# Patient Record
Sex: MALE | Race: WHITE | NOT HISPANIC OR LATINO | Employment: FULL TIME | URBAN - METROPOLITAN AREA
[De-identification: names, ages, dates, MRNs, and addresses within clinical notes are randomized per-mention and may not be internally consistent; named-entity substitution may affect disease eponyms.]

---

## 2025-02-26 ENCOUNTER — OFFICE VISIT (OUTPATIENT)
Dept: URGENT CARE | Facility: CLINIC | Age: 25
End: 2025-02-26

## 2025-02-26 VITALS
OXYGEN SATURATION: 95 % | DIASTOLIC BLOOD PRESSURE: 83 MMHG | RESPIRATION RATE: 16 BRPM | SYSTOLIC BLOOD PRESSURE: 123 MMHG | TEMPERATURE: 98.7 F | HEART RATE: 109 BPM

## 2025-02-26 DIAGNOSIS — J11.1 INFLUENZA-LIKE ILLNESS: Primary | ICD-10-CM

## 2025-02-26 DIAGNOSIS — R52 BODY ACHES: ICD-10-CM

## 2025-02-26 DIAGNOSIS — R05.9 COUGH, UNSPECIFIED TYPE: ICD-10-CM

## 2025-02-26 PROCEDURE — 99203 OFFICE O/P NEW LOW 30 MIN: CPT | Performed by: PHYSICIAN ASSISTANT

## 2025-02-26 RX ORDER — OSELTAMIVIR PHOSPHATE 75 MG/1
75 CAPSULE ORAL 2 TIMES DAILY
Qty: 10 CAPSULE | Refills: 0 | Status: SHIPPED | OUTPATIENT
Start: 2025-02-26 | End: 2025-03-03

## 2025-02-26 NOTE — LETTER
February 26, 2025     Patient: Hugh Valladares   YOB: 2000   Date of Visit: 2/26/2025       To Whom It May Concern:    Hugh Valladares was seen in my clinic on 2/26/2025 at 9:15 am. Please excuse Hugh Mcgowan for his absence from work on this day to make the appointment. He should be excused from work 2/26/25-2/27/25. He may return to work on 2/28/25.     If you have any questions or concerns, please don't hesitate to call.         Sincerely,         Julia Mike PA-C        CC: No Recipients

## 2025-02-26 NOTE — PROGRESS NOTES
Subjective   Patient ID: Hugh Juan M Valladares is a 24 y.o. male who presents for Cough and bodyaches.    HPI     Productive cough and body aches. Started <48 hours ago with only the cough. Yesterday, ramped up with the body aches. Went home and took NyQuil and DayQuil. Woke up and cough and body aches were worse. Temperature this AM was 102.0 F. Denies: dizziness, CP, SOB, abdominal pain, N/V/D, rash, swelling, bruising, ear pain, loss of sense of taste/smell. He denies any sick contacts that he is aware of. He does have a decreased appetite.     Review of Systems   All other systems reviewed and are negative.    Objective   /83   Pulse 109   Temp 37.1 °C (98.7 °F)   Resp 16   SpO2 95%     Physical Exam  Vitals reviewed.   Constitutional:       General: He is awake.      Appearance: Normal appearance. He is well-developed.   HENT:      Head: Normocephalic and atraumatic.      Right Ear: Tympanic membrane and ear canal normal.      Left Ear: Tympanic membrane and ear canal normal.      Nose: Nose normal.      Mouth/Throat:      Lips: Pink.      Mouth: Mucous membranes are dry.      Pharynx: Oropharynx is clear.   Cardiovascular:      Rate and Rhythm: Normal rate and regular rhythm.   Pulmonary:      Effort: Pulmonary effort is normal.      Breath sounds: Normal breath sounds.   Musculoskeletal:      Cervical back: Full passive range of motion without pain.      Right lower leg: No edema.      Left lower leg: No edema.   Skin:     General: Skin is warm and dry.      Findings: No lesion or rash.   Neurological:      General: No focal deficit present.      Mental Status: He is alert and oriented to person, place, and time.      Cranial Nerves: No facial asymmetry.      Motor: Motor function is intact.      Gait: Gait is intact.   Psychiatric:         Attention and Perception: Attention normal.         Mood and Affect: Mood and affect normal.         Assessment/Plan   Problem List Items Addressed This Visit     None  Visit Diagnoses         Codes    Influenza-like illness    -  Primary J11.1    Relevant Medications    oseltamivir (Tamiflu) 75 mg capsule    Cough, unspecified type     R05.9    Relevant Orders    Sars-CoV-2 and Influenza A/B PCR    Body aches     R52    Relevant Orders    Sars-CoV-2 and Influenza A/B PCR          We currently do not have rapid flu testing available  With Hx of 102+ degree fever this AM, body aches, headache, cough - suspect influenza A   Flu and COVID PCR was done to confirm   Will begin Tamiflu, as pt is within the 48 hour window of treatment  Continue OTC symptomatic treatment and fever-reducing medications  Increase rest and fluids  Hydrate with Liquid IV/Gatorade/Powerade/etc.     Red flag symptoms reviewed with patient and all questions answered. Patient or parent/guardian verbalized understanding and agreement with care plan as above. All in office testing reviewed with patient. If symptoms worsen or do not improve, patient is to follow up with PCP or report to the ER.

## 2025-02-27 LAB
FLUAV RNA RESP QL NAA+PROBE: DETECTED
FLUBV RNA RESP QL NAA+PROBE: NOT DETECTED
SARS-COV-2 RNA RESP QL NAA+PROBE: NOT DETECTED